# Patient Record
Sex: MALE | Race: WHITE | NOT HISPANIC OR LATINO | Employment: OTHER | URBAN - METROPOLITAN AREA
[De-identification: names, ages, dates, MRNs, and addresses within clinical notes are randomized per-mention and may not be internally consistent; named-entity substitution may affect disease eponyms.]

---

## 2018-06-12 NOTE — PATIENT DISCUSSION
Surgery  Counseling: I have discussed the option of glasses versus cataract surgery versus following. It was explained that when vision no longer meets the patient's visual needs and a new prescription for glasses is not likely to improve the patient's visual symptoms, the option of cataract surgery is a reasonable next step. It was explained that there is no guarantee that removing the cataract will improve their visual symptoms, however; it is believed that the cataract is contributing to the patient's visual impairment and surgery may significantly improve both the visual and functional status of the patient. The risks, benefits and alternatives of surgery were discussed with the patient. After this discussion, the patient desires to proceed with cataract surgery with implantation of an intraocular lens to improve vision for driving, watching tv and reducing glare.

## 2020-02-17 ENCOUNTER — CATARACT CONSULT (OUTPATIENT)
Dept: URBAN - METROPOLITAN AREA CLINIC 39 | Facility: CLINIC | Age: 65
End: 2020-02-17

## 2020-02-17 DIAGNOSIS — H26.491: ICD-10-CM

## 2020-02-17 DIAGNOSIS — H35.371: ICD-10-CM

## 2020-02-17 DIAGNOSIS — H18.59: ICD-10-CM

## 2020-02-17 DIAGNOSIS — Z97.3: ICD-10-CM

## 2020-02-17 DIAGNOSIS — Z79.899: ICD-10-CM

## 2020-02-17 DIAGNOSIS — H25.812: ICD-10-CM

## 2020-02-17 PROCEDURE — V2799PMN IMPRIMIS PRED-MOXI-NEPAF 5ML

## 2020-02-17 PROCEDURE — 92025-2 CORNEAL TOPOGRAPHY, PT

## 2020-02-17 PROCEDURE — 92015 DETERMINE REFRACTIVE STATE: CPT

## 2020-02-17 PROCEDURE — 99204 OFFICE O/P NEW MOD 45 MIN: CPT

## 2020-02-17 PROCEDURE — 92134 CPTRZ OPH DX IMG PST SGM RTA: CPT

## 2020-02-17 PROCEDURE — 92136TC INTERFEROMETRY - TECHNICAL COMPONENT

## 2020-02-17 ASSESSMENT — TONOMETRY
OD_IOP_MMHG: 16
OS_IOP_MMHG: 16

## 2020-02-17 ASSESSMENT — VISUAL ACUITY
OD_CC: J2
OS_AM: 20/25
OD_BAT: 20/30
OD_SC: J10
OS_SC: CF 6FT
OS_SC: J1 @ 4"
OS_BAT: 20/200
OS_CC: J1 @ 4"
OD_SC: 20/20-1

## 2020-02-25 ENCOUNTER — PRE-OP/H&P (OUTPATIENT)
Dept: URBAN - METROPOLITAN AREA CLINIC 39 | Facility: CLINIC | Age: 65
End: 2020-02-25

## 2020-02-25 ENCOUNTER — SURGERY/PROCEDURE (OUTPATIENT)
Dept: URBAN - METROPOLITAN AREA CLINIC 39 | Facility: CLINIC | Age: 65
End: 2020-02-25

## 2020-02-25 DIAGNOSIS — Z79.899: ICD-10-CM

## 2020-02-25 DIAGNOSIS — H26.491: ICD-10-CM

## 2020-02-25 DIAGNOSIS — Z97.3: ICD-10-CM

## 2020-02-25 DIAGNOSIS — H25.812: ICD-10-CM

## 2020-02-25 DIAGNOSIS — H35.371: ICD-10-CM

## 2020-02-25 DIAGNOSIS — H18.59: ICD-10-CM

## 2020-02-25 PROCEDURE — 66984CV REMOVE CATARACT, INSERT LENS, CUSTOM VISION

## 2020-02-25 PROCEDURE — 99211T TECH SERVICE

## 2020-02-25 PROCEDURE — 65772LRI LRI DURING CAT SX

## 2020-02-25 PROCEDURE — 66999LNSR LENSAR LASER FOR CAT SX

## 2020-02-26 ENCOUNTER — CATARACT POST-OP 1-DAY (OUTPATIENT)
Dept: URBAN - METROPOLITAN AREA CLINIC 35 | Facility: CLINIC | Age: 65
End: 2020-02-26

## 2020-02-26 DIAGNOSIS — Z96.1: ICD-10-CM

## 2020-02-26 PROCEDURE — 99024 POSTOP FOLLOW-UP VISIT: CPT

## 2020-02-26 ASSESSMENT — TONOMETRY
OD_IOP_MMHG: 12
OS_IOP_MMHG: 16

## 2020-02-26 ASSESSMENT — VISUAL ACUITY
OD_SC: 20/30
OS_SC: 20/20

## 2020-03-18 ENCOUNTER — POST-OP CATARACT (OUTPATIENT)
Dept: URBAN - METROPOLITAN AREA CLINIC 35 | Facility: CLINIC | Age: 65
End: 2020-03-18

## 2020-03-18 DIAGNOSIS — Z96.1: ICD-10-CM

## 2020-03-18 PROCEDURE — 99024 POSTOP FOLLOW-UP VISIT: CPT

## 2020-03-18 ASSESSMENT — VISUAL ACUITY
OD_SC: 20/25-2
OS_SC: 20/20

## 2020-03-18 ASSESSMENT — TONOMETRY
OD_IOP_MMHG: 12
OS_IOP_MMHG: 12

## 2020-10-15 NOTE — PATIENT DISCUSSION
DISCUSSED BENEFIT FROM BRITNEY DUE TO FLEXIBILITY OF ADJUSTING THE IOL UP TO 3 TIMES AFTER SURGERY. PATIENT UNDERSTANDS THE NEED FOR FULL TIME UV PROTECTIVE GLASSES UNTIL 24 HOURS AFTER THE IOL IS LOCKED.

## 2020-10-15 NOTE — PATIENT DISCUSSION
CATARACT, OD - VISUALLY SIGNIFICANT OD. SCHEDULE SX OD.   GLS RX GIVEN TO FILL IF DESIRES IN THE EVENT PT DOES NOT PROCEED W/ SX.

## 2022-06-09 ENCOUNTER — COMPREHENSIVE EXAM (OUTPATIENT)
Dept: URBAN - METROPOLITAN AREA CLINIC 35 | Facility: CLINIC | Age: 67
End: 2022-06-09

## 2022-06-09 DIAGNOSIS — H31.091: ICD-10-CM

## 2022-06-09 DIAGNOSIS — Z96.1: ICD-10-CM

## 2022-06-09 DIAGNOSIS — H43.813: ICD-10-CM

## 2022-06-09 DIAGNOSIS — D31.32: ICD-10-CM

## 2022-06-09 DIAGNOSIS — H26.491: ICD-10-CM

## 2022-06-09 PROCEDURE — 92014 COMPRE OPH EXAM EST PT 1/>: CPT

## 2022-06-09 PROCEDURE — 92015 DETERMINE REFRACTIVE STATE: CPT

## 2022-06-09 ASSESSMENT — VISUAL ACUITY
OU_SC: J6
OD_SC: 20/30
OS_SC: 20/20-2
OS_SC: J12-
OU_SC: 20/25+2
OD_SC: J10

## 2022-06-09 ASSESSMENT — TONOMETRY
OS_IOP_MMHG: 12
OD_IOP_MMHG: 12

## 2023-06-09 ENCOUNTER — COMPREHENSIVE EXAM (OUTPATIENT)
Dept: URBAN - METROPOLITAN AREA CLINIC 35 | Facility: CLINIC | Age: 68
End: 2023-06-09

## 2023-06-09 DIAGNOSIS — H52.7: ICD-10-CM

## 2023-06-09 DIAGNOSIS — H35.371: ICD-10-CM

## 2023-06-09 DIAGNOSIS — H18.593: ICD-10-CM

## 2023-06-09 DIAGNOSIS — H33.301: ICD-10-CM

## 2023-06-09 DIAGNOSIS — H43.813: ICD-10-CM

## 2023-06-09 DIAGNOSIS — D31.32: ICD-10-CM

## 2023-06-09 DIAGNOSIS — H26.493: ICD-10-CM

## 2023-06-09 PROCEDURE — 92015 DETERMINE REFRACTIVE STATE: CPT

## 2023-06-09 PROCEDURE — 92014 COMPRE OPH EXAM EST PT 1/>: CPT

## 2023-06-09 ASSESSMENT — VISUAL ACUITY
OS_SC: 20/20
OD_SC: J3+
OD_CC: J1
OU_SC: 20/20
OD_SC: 20/30+2
OU_CC: J1
OU_SC: J2
OS_CC: J1
OS_SC: J6

## 2023-06-09 ASSESSMENT — TONOMETRY
OD_IOP_MMHG: 12
OS_IOP_MMHG: 12

## 2024-09-06 ENCOUNTER — COMPREHENSIVE EXAM (OUTPATIENT)
Dept: URBAN - METROPOLITAN AREA CLINIC 35 | Facility: CLINIC | Age: 69
End: 2024-09-06

## 2024-09-06 DIAGNOSIS — H18.593: ICD-10-CM

## 2024-09-06 DIAGNOSIS — G43.B0: ICD-10-CM

## 2024-09-06 DIAGNOSIS — H52.7: ICD-10-CM

## 2024-09-06 DIAGNOSIS — H43.813: ICD-10-CM

## 2024-09-06 DIAGNOSIS — H35.371: ICD-10-CM

## 2024-09-06 DIAGNOSIS — H26.493: ICD-10-CM

## 2024-09-06 DIAGNOSIS — H33.303: ICD-10-CM

## 2024-09-06 DIAGNOSIS — D31.32: ICD-10-CM

## 2024-09-06 PROCEDURE — 92015 DETERMINE REFRACTIVE STATE: CPT

## 2024-09-06 PROCEDURE — 92014 COMPRE OPH EXAM EST PT 1/>: CPT

## 2025-06-10 ENCOUNTER — CONSULTATION/EVALUATION (OUTPATIENT)
Age: 70
End: 2025-06-10

## 2025-06-10 ENCOUNTER — SURGERY/PROCEDURE (OUTPATIENT)
Age: 70
End: 2025-06-10

## 2025-06-10 DIAGNOSIS — H35.371: ICD-10-CM

## 2025-06-10 DIAGNOSIS — H26.493: ICD-10-CM

## 2025-06-10 DIAGNOSIS — H18.593: ICD-10-CM

## 2025-06-10 DIAGNOSIS — D31.32: ICD-10-CM

## 2025-06-10 PROCEDURE — 92134 CPTRZ OPH DX IMG PST SGM RTA: CPT

## 2025-06-10 PROCEDURE — 92015 DETERMINE REFRACTIVE STATE: CPT

## 2025-06-10 PROCEDURE — 6682150 YAG CAPSULOTOMY: Mod: 50

## 2025-06-10 PROCEDURE — 92025 CPTRIZED CORNEAL TOPOGRAPHY: CPT

## 2025-06-10 PROCEDURE — 92012 INTRM OPH EXAM EST PATIENT: CPT | Mod: 50

## 2025-06-18 ENCOUNTER — POST-OP (OUTPATIENT)
Age: 70
End: 2025-06-18

## 2025-06-18 DIAGNOSIS — Z98.890: ICD-10-CM

## 2025-06-18 PROCEDURE — 99024 POSTOP FOLLOW-UP VISIT: CPT | Mod: 55

## 2025-07-09 NOTE — PATIENT DISCUSSION
**For patient's undergoing cataract surgery- In the event you decline a refractive package, traditional cataract surgery will be performed and a monofocal IOL will be implanted. Patient understands that they will need glasses for distance, intermediate and near vision. (4) rarely moist